# Patient Record
Sex: MALE
[De-identification: names, ages, dates, MRNs, and addresses within clinical notes are randomized per-mention and may not be internally consistent; named-entity substitution may affect disease eponyms.]

---

## 2020-01-10 ENCOUNTER — NURSE TRIAGE (OUTPATIENT)
Dept: OTHER | Facility: CLINIC | Age: 45
End: 2020-01-10

## 2020-01-11 NOTE — TELEPHONE ENCOUNTER
Patient is calling because his blood pressure medication is making him dizzy, unable to sleep, and stomach cramping. He was wanting to know if he can stop taking it. Patient takes medication once a day and it is at night. Current blood pressure is 136/87  HR is 94bpm  Current pain level is 1/10. Diarrhea present  Denies chest pain  Denies difficulty breathing  Denies palpitations  States he has been drinking plenty of fluids    Requested patient to contact on-call PCP regarding medication question. Please do not respond to the triage nurse through this encounter. Any subsequent communication should be directly with the patient.           Reason for Disposition   Patient sounds very sick or weak to the triager    Protocols used: [facility]